# Patient Record
Sex: FEMALE | Race: WHITE | URBAN - METROPOLITAN AREA
[De-identification: names, ages, dates, MRNs, and addresses within clinical notes are randomized per-mention and may not be internally consistent; named-entity substitution may affect disease eponyms.]

---

## 2017-08-17 ENCOUNTER — OFFICE VISIT (OUTPATIENT)
Dept: NEUROLOGY | Age: 27
End: 2017-08-17

## 2017-08-17 VITALS
HEART RATE: 121 BPM | DIASTOLIC BLOOD PRESSURE: 78 MMHG | SYSTOLIC BLOOD PRESSURE: 126 MMHG | WEIGHT: 161 LBS | BODY MASS INDEX: 26.79 KG/M2 | OXYGEN SATURATION: 98 %

## 2017-08-17 DIAGNOSIS — G43.011 INTRACTABLE MIGRAINE WITHOUT AURA AND WITH STATUS MIGRAINOSUS: Primary | ICD-10-CM

## 2017-08-17 RX ORDER — ESCITALOPRAM OXALATE 5 MG/1
TABLET ORAL DAILY
COMMUNITY

## 2017-08-17 RX ORDER — TOPIRAMATE 25 MG/1
25 TABLET ORAL
Qty: 30 TAB | Refills: 5 | Status: SHIPPED | OUTPATIENT
Start: 2017-08-17 | End: 2017-10-13 | Stop reason: ALTCHOICE

## 2017-08-17 NOTE — MR AVS SNAPSHOT
Visit Information Date & Time Provider Department Dept. Phone Encounter #  
 8/17/2017 10:20 AM Clem Guallpa MD [facility] [phone] [id number] Follow-up Instructions Return in about 1 month (around 9/17/2017). Upcoming Health Maintenance Date Due  
 HPV AGE 9Y-34Y (1 of 3 - Female 3 Dose Series) 3/4/2002 Pneumococcal 19-64 Medium Risk (1 of 1 - PPSV23) 3/4/2010 DTaP/Tdap/Td series (1 - Tdap) 3/4/2012 PAP AKA CERVICAL CYTOLOGY 3/4/2012 INFLUENZA AGE 9 TO ADULT 8/1/2017 Allergies as of 8/17/2017  Review Complete On: 8/17/2017 By: Clem Guallpa MD  
  
 Severity Noted Reaction Type Reactions Bactrim [Sulfamethoprim Ds]  03/14/2013    Hives Ceclor [Cefaclor]  03/14/2013    Hives Pcn [Penicillins]  03/14/2013    Hives Current Immunizations  Never Reviewed No immunizations on file. Not reviewed this visit You Were Diagnosed With   
  
 Codes Comments Intractable migraine without aura and with status migrainosus    -  Primary ICD-10-CM: I05.984 
ICD-9-CM: 346.13 Vitals BP Pulse Weight(growth percentile) SpO2 BMI Smoking Status 126/78 (!) 121 161 lb (73 kg) 98% 26.79 kg/m2 Former Smoker BMI and BSA Data Body Mass Index Body Surface Area  
 26.79 kg/m 2 1.83 m 2 Preferred Pharmacy Pharmacy Name Phone [id number] [address] [phone] Your Updated Medication List  
  
   
This list is accurate as of: 8/17/17 11:10 AM.  Always use your most recent med list.  
  
  
  
  
 ADDERALL XR 20 mg XR capsule Generic drug:  amphetamine-dextroamphetamine XR Take 20 mg by mouth every morning. LEXAPRO 5 mg tablet Generic drug:  escitalopram oxalate Take  by mouth daily. OTHER Birthcontrol  
  
 topiramate 25 mg tablet Commonly known as:  TOPAMAX Take 1 Tab by mouth nightly. Prescriptions Sent to Pharmacy Refills  
 topiramate (TOPAMAX) 25 mg tablet 5 Sig: Take 1 Tab by mouth nightly. Class: Normal  
 Pharmacy: [id number] - [facility] [address]  #: [phone] Route: Oral  
  
Follow-up Instructions Return in about 1 month (around 9/17/2017). Patient Instructions PRESCRIPTION REFILL POLICY [facility] Statement to Patients April 1, 2014 In an effort to ensure the large volume of patient prescription refills is processed in the most efficient and expeditious manner, we are asking our patients to assist us by calling your Pharmacy for all prescription refills, this will include also your  Mail Order Pharmacy. The pharmacy will contact our office electronically to continue the refill process. Please do not wait until the last minute to call your pharmacy. We need at least 48 hours (2days) to fill prescriptions. We also encourage you to call your pharmacy before going to  your prescription to make sure it is ready. With regard to controlled substance prescription refill requests (narcotic refills) that need to be picked up at our office, we ask your cooperation by providing us with at least 72 hours (3days) notice that you will need a refill. We will not refill narcotic prescription refill requests after 4:00pm on any weekday, Monday through Thursday, or after 2:00pm on Fridays, or on the weekends. We encourage everyone to explore another way of getting your prescription refill request processed using [employer], our patient web portal through our electronic medical record system. [employer] is an efficient and effective way to communicate your medication request directly to the office and  downloadable as an lucia on your smart phone .  [employer] also features a review functionality that allows you to view your medication list as well as leave messages for your physician. Are you ready to get connected? If so please review the attatched instructions or speak to any of our staff to get you set up right away! Thank you so much for your cooperation. Should you have any questions please contact our Practice Administrator. The Physicians and Staff,  [facility] Introducing [facility] SERVICES! Rosette Natyjosé manuel introduces [employer] patient portal. Now you can access parts of your medical record, email your doctor's office, and request medication refills online. 1. In your internet browser, go to [email or web address]. [email or web address] 2. Click on the First Time User? Click Here link in the Sign In box. You will see the New Member Sign Up page. 3. Enter your [employer] Access Code exactly as it appears below. You will not need to use this code after youve completed the sign-up process. If you do not sign up before the expiration date, you must request a new code. · [employer] Access Code: [email or web address] Expires: 11/15/2017  9:47 AM 
 
4. Enter the last four digits of your Social Security Number (xxxx) and Date of Birth (mm/dd/yyyy) as indicated and click Submit. You will be taken to the next sign-up page. 5. Create a [employer] ID. This will be your [employer] login ID and cannot be changed, so think of one that is secure and easy to remember. 6. Create a [employer] password. You can change your password at any time. 7. Enter your Password Reset Question and Answer. This can be used at a later time if you forget your password. 8. Enter your e-mail address. You will receive e-mail notification when new information is available in [address] 9. Click Sign Up. You can now view and download portions of your medical record. 10. Click the Download Summary menu link to download a portable copy of your medical information.  
 
If you have questions, please visit the Frequently Asked Questions section of the [employer]. Remember, [employer] is NOT to be used for urgent needs. For medical emergencies, dial 911. Now available from your iPhone and Android! Please provide this summary of care documentation to your next provider. Your primary care clinician is listed as Yoana Jimenez. If you have any questions after today's visit, please call [phone].

## 2017-08-17 NOTE — PROGRESS NOTES
NEUROLOGY NEW PATIENT OFFICE CONSULTATION      8/17/2017    RE: Leigh Nava         1990      REFERRED BY:  Norma Suarez MD        CHIEF COMPLAINT:  This is Leigh Nava is a 33 y.o. female right handed  who had concerns including Migraine. HPI:       Since high school, patient has been complaining of headaches, R frontotemporal area, 5/10, for the past 5 yrs have been daily, constant, throbbing, aggravated by weather, relieved with sleeping, (+) nausea (-) vomiting (+) photophobia (+) phonophobia (-) visual auras. Admits to eating chocolate and peperroni. Tried Imitrex (caused nausea), Exedrin (upset stomach), Indomethacin (mild benefit with fatigue)    Review of Systems   Constitutional: Negative for chills and fever. HENT: Negative for hearing loss. Skin: Negative for rash. All other systems reviewed and are negative    Past Medical Hx  Past Medical History:   Diagnosis Date    Asthma     Cervical rib     Headache    ADD  Anxiety - treated by PCP    Social Hx  Social History     Social History    Marital status: SINGLE     Spouse name: N/A    Number of children: N/A    Years of education: N/A     Social History Main Topics    Smoking status: Former Smoker     Packs/day: 1.00     Years: 5.00    Smokeless tobacco: Never Used    Alcohol use No      Comment: occ    Drug use: No    Sexual activity: Not Asked     Other Topics Concern    None     Social History Narrative       Family Hx  Family History   Problem Relation Age of Onset    High Cholesterol Father     Headache Maternal Grandfather    Mother and Father - migraines    ALLERGIES  Allergies   Allergen Reactions    Bactrim [Sulfamethoprim Ds] Hives    Ceclor [Cefaclor] Hives    Pcn [Penicillins] Hives       CURRENT MEDS  Current Outpatient Prescriptions   Medication Sig Dispense Refill    escitalopram oxalate (LEXAPRO) 5 mg tablet Take  by mouth daily.       topiramate (TOPAMAX) 25 mg tablet Take 1 Tab by mouth nightly. 30 Tab 5    amphetamine-dextroamphetamine XR (ADDERALL XR) 20 mg XR capsule Take 20 mg by mouth every morning.  OTHER Birthcontrol             PREVIOUS WORKUP: (reviewed)  IMAGING:    CT Results (recent):  No results found for this or any previous visit. MRI Results (recent):  No results found for this or any previous visit. IR Results (recent):  No results found for this or any previous visit. VAS/US Results (recent):  No results found for this or any previous visit. LABS (reviewed)  No results found for this or any previous visit. Physical Exam:     Visit Vitals    /78    Pulse (!) 121    Wt 73 kg (161 lb)    SpO2 98%    BMI 26.79 kg/m2     General:  Alert, cooperative, no distress. Head:  Normocephalic, without obvious abnormality, atraumatic. Eyes:  Conjunctivae/corneas clear. Lungs:  Heart:   Non labored breathing  Regular rate and rhythm, no carotid bruits   Abdomen:   Soft, non-distended   Extremities: Extremities normal, atraumatic, no cyanosis or edema. Pulses: 2+ and symmetric all extremities. Skin: Skin color, texture, turgor normal. No rashes or lesions.   Neurologic Exam     Gen: Attention normal             Language: naming, repetition, fluency normal             Memory: intact recent and remote memory  Cranial Nerves:  I: smell Not tested   II: visual fields Full to confrontation   II: pupils Equal, round, reactive to light   II: optic disc No papilledema   III,VII: ptosis none   III,IV,VI: extraocular muscles  Full ROM   V: mastication normal   V: facial light touch sensation  normal   VII: facial muscle function   symmetric   VIII: hearing symmetric   IX: soft palate elevation  normal   XI: trapezius strength  5/5   XI: sternocleidomastoid strength 5/5   XI: neck flexion strength  5/5   XII: tongue  midline     Motor: normal bulk and tone, no tremor              Strength: 5/5 all four extremities  Sensory: intact to LT, PP, vibration, and JPS  Reflexes: 2+ throughout; Down going toes  Coordination: Good FTN and HTS, Romberg negative  Gait: normal gait including tandem            Impression:     Faith Poole is a 33 y.o. female with Asthma; Anxiety and ADD. who sdince high school, patient has been complaining of headaches, R frontotemporal area, 5/10, for the past 5 yrs have been daily and severe, constant, throbbing, aggravated by weather, relieved with sleeping, associated with nausea, photophobia and phonophobia consistent with migraine, without auras, intractable. RECOMMENDATIONS  1. Trial of Topamax 25 mg QHS as migrainer prophylaxis  2. Given samples of Cambia and Treximet to try to abort headaches  3. Discussed the need for headache diary and went through the list that can trigger headache (weather, pepperoni, chocolate)    Follow-up Disposition:  Return in about 1 month (around 9/17/2017).         Thank you for the consultation      Earl Barrera MD  Diplomate, American Board of Psychiatry and Neurology  Diplomate, Neuromuscular Medicine  Diplomate, American Board of Electrodiagnostic Medicine    Greater than 50% of time spent counseling patient      CC: Fredy Chavez MD  Fax: [phone]

## 2017-08-17 NOTE — PATIENT INSTRUCTIONS
[facility]   Statement to Patients  April 1, 2014      In an effort to ensure the large volume of patient prescription refills is processed in the most efficient and expeditious manner, we are asking our patients to assist us by calling your Pharmacy for all prescription refills, this will include also your  Mail Order Pharmacy. The pharmacy will contact our office electronically to continue the refill process. Please do not wait until the last minute to call your pharmacy. We need at least 48 hours (2days) to fill prescriptions. We also encourage you to call your pharmacy before going to  your prescription to make sure it is ready. With regard to controlled substance prescription refill requests (narcotic refills) that need to be picked up at our office, we ask your cooperation by providing us with at least 72 hours (3days) notice that you will need a refill. We will not refill narcotic prescription refill requests after 4:00pm on any weekday, Monday through Thursday, or after 2:00pm on Fridays, or on the weekends. We encourage everyone to explore another way of getting your prescription refill request processed using [employer], our patient web portal through our electronic medical record system. [employer] is an efficient and effective way to communicate your medication request directly to the office and  downloadable as an lucia on your smart phone . [employer] also features a review functionality that allows you to view your medication list as well as leave messages for your physician. Are you ready to get connected? If so please review the attatched instructions or speak to any of our staff to get you set up right away! Thank you so much for your cooperation. Should you have any questions please contact our Practice Administrator.     The Physicians and Staff,  [facility]

## 2017-08-17 NOTE — LETTER
8/17/2017 11:13 AM 
 
Patient:  Patrick Babb YOB: 1990 Date of Visit: 8/17/2017 Dear Ana Hsu MD 
[address] [place] [id number] VIA Facsimile: [phone] 
 : Thank you for referring Ms. Patrick Babb to me for evaluation/treatment. Below are the relevant portions of my assessment and plan of care. If you have questions, please do not hesitate to call me. I look forward to following Ms. Sonya Soto along with you. Sincerely, Juhi Miguel MD

## 2017-09-15 ENCOUNTER — OFFICE VISIT (OUTPATIENT)
Dept: NEUROLOGY | Age: 27
End: 2017-09-15

## 2017-09-15 VITALS
DIASTOLIC BLOOD PRESSURE: 82 MMHG | HEART RATE: 118 BPM | BODY MASS INDEX: 26.29 KG/M2 | WEIGHT: 158 LBS | OXYGEN SATURATION: 98 % | SYSTOLIC BLOOD PRESSURE: 122 MMHG

## 2017-09-15 DIAGNOSIS — G43.011 INTRACTABLE MIGRAINE WITHOUT AURA AND WITH STATUS MIGRAINOSUS: Primary | ICD-10-CM

## 2017-09-15 RX ORDER — PROPRANOLOL HYDROCHLORIDE 80 MG/1
80 CAPSULE, EXTENDED RELEASE ORAL
Qty: 30 CAP | Refills: 5 | Status: SHIPPED | OUTPATIENT
Start: 2017-09-15 | End: 2017-10-13 | Stop reason: SDUPTHER

## 2017-09-15 RX ORDER — SUMATRIPTAN AND NAPROXEN SODIUM 85; 500 MG/1; MG/1
TABLET, FILM COATED ORAL
Qty: 9 TAB | Refills: 3 | Status: SHIPPED | OUTPATIENT
Start: 2017-09-15

## 2017-09-15 NOTE — PROGRESS NOTES
Neurology Progress Note    Patient ID:  Shayla Jay  [id number]  89 y.o.  1990      Subjective:   History:  Shayla Jay is a 33 y.o. female with Asthma; Anxiety and ADD. who since high school, has been complaining of headaches, R frontotemporal area, 5/10, for the past 5 yrs have been daily and severe, constant, throbbing, aggravated by weather, relieved with sleeping, associated with nausea, photophobia and phonophobia consistent with migraine, without auras, intractable.     Patient tried Topamax 25 mg QHS but made her slow in thinking and fatigue with no improvement of headaches. Treximet works with no side effects. Cambia not as much  Taking Advil 3-6 tabs every day for the past 5 yrs. Not sleeping well because of neighbor who walks loudly.        ROS:  Per HPI-  Otherwise the remainder of ROS was negative    Social Hx  Social History     Social History    Marital status: SINGLE     Spouse name: N/A    Number of children: N/A    Years of education: N/A     Social History Main Topics    Smoking status: Former Smoker     Packs/day: 1.00     Years: 5.00    Smokeless tobacco: Never Used    Alcohol use No      Comment: occ    Drug use: No    Sexual activity: Not Asked     Other Topics Concern    None     Social History Narrative       Meds:  Current Outpatient Prescriptions on File Prior to Visit   Medication Sig Dispense Refill    escitalopram oxalate (LEXAPRO) 5 mg tablet Take  by mouth daily.  topiramate (TOPAMAX) 25 mg tablet Take 1 Tab by mouth nightly. 30 Tab 5    amphetamine-dextroamphetamine XR (ADDERALL XR) 20 mg XR capsule Take 20 mg by mouth every morning.  OTHER Birthcontrol       No current facility-administered medications on file prior to visit. Imaging:    CT Results (recent):  No results found for this or any previous visit. MRI Results (recent):  No results found for this or any previous visit.     IR Results (recent):  No results found for this or any previous visit.    VAS/US Results (recent):  No results found for this or any previous visit. Reviewed records in [facility] and media tab today    Lab Review     No results found for any previous visit. Objective:       Exam:  Visit Vitals    /82    Pulse (!) 118    Wt 71.7 kg (158 lb)    SpO2 98%    BMI 26.29 kg/m2     Gen: Awake, alert, follows commands  Appropriate appearance, normal speech. Oriented to all spheres. No visual field defect on confrontation exam.  Full eyes movement, with no nystagmus, no diplopia, no ptosis. Normal gag and swallow. All remaining cranial nerves were normal  Motor function: 5/5 in all extremities  Sensory: intact to LT, PP and JPS  Good FTN and HTS   Gait: Normal    Assessment:       ICD-10-CM ICD-9-CM    1. Intractable migraine without aura and with status migrainosus G43.011 346.13 propranolol LA (INDERAL LA) 80 mg SR capsule      SUMAtriptan-naproxen (TREXIMET)  mg tablet           Plan:   1. Switch to Inderal LA 80 mg QHS as migraine prophylaxis. Monitor BP  2. Continue Treximet prn to try to abort headaches  3. Continue headache diary and list that can trigger headache (weather, pepperoni, chocolate)    Follow-up Disposition:  Return in about 1 month (around 10/15/2017).           Svitlana Ambrose MD  Diplomate, American Board of Psychiatry and Neurology  Diplomate, Neuromuscular Medicine  Diplomate, American Board of Electrodiagnostic Medicine

## 2017-09-15 NOTE — MR AVS SNAPSHOT
Visit Information Date & Time Provider Department Dept. Phone Encounter #  
 9/15/2017  9:40 AM Saurav Reynolds MD [facility] Neurology [facility] [phone] [id number] Follow-up Instructions Return in about 1 month (around 10/15/2017). Upcoming Health Maintenance Date Due  
 HPV AGE 9Y-34Y (1 of 3 - Female 3 Dose Series) 3/4/2002 DTaP/Tdap/Td series (1 - Tdap) 3/4/2012 PAP AKA CERVICAL CYTOLOGY 3/4/2012 INFLUENZA AGE 9 TO ADULT 8/1/2017 Allergies as of 9/15/2017  Review Complete On: 9/15/2017 By: Jose Peralta LPN Severity Noted Reaction Type Reactions Bactrim [Sulfamethoprim Ds]  03/14/2013    Hives Ceclor [Cefaclor]  03/14/2013    Hives Pcn [Penicillins]  03/14/2013    Hives Current Immunizations  Never Reviewed No immunizations on file. Not reviewed this visit You Were Diagnosed With   
  
 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 
ICD-9-CM: 346.13 Vitals BP Pulse Weight(growth percentile) SpO2 BMI Smoking Status 122/82 (!) 118 158 lb (71.7 kg) 98% 26.29 kg/m2 Former Smoker Vitals History BMI and BSA Data Body Mass Index Body Surface Area  
 26.29 kg/m 2 1.81 m 2 Preferred Pharmacy Pharmacy Name Phone [address], [address] [address] [facility] [phone] Your Updated Medication List  
  
   
This list is accurate as of: 9/15/17 10:14 AM.  Always use your most recent med list.  
  
  
  
  
 ADDERALL XR 20 mg XR capsule Generic drug:  amphetamine-dextroamphetamine XR Take 20 mg by mouth every morning. LEXAPRO 5 mg tablet Generic drug:  escitalopram oxalate Take  by mouth daily. OTHER Birthcontrol  
  
 propranolol LA 80 mg SR capsule Commonly known as:  INDERAL LA Take 1 Cap by mouth nightly. SUMAtriptan-naproxen  mg tablet Commonly known as:  TREXIMET Take one tablet at headache onset and repeat in 2 hours x1 prn  
  
 topiramate 25 mg tablet Commonly known as:  TOPAMAX Take 1 Tab by mouth nightly. Prescriptions Sent to Pharmacy Refills  
 propranolol LA (INDERAL LA) 80 mg SR capsule 5 Sig: Take 1 Cap by mouth nightly. Class: Normal  
 Pharmacy: [facility]/pharmacy #[id number] - [place], [place] - [address] [address] Ph #: [phone] Route: Oral  
 SUMAtriptan-naproxen (TREXIMET)  mg tablet 3 Sig: Take one tablet at headache onset and repeat in 2 hours x1 prn Class: Normal  
 Pharmacy: [facility] [id number] [facility] Ph #: [phone] Follow-up Instructions Return in about 1 month (around 10/15/2017). Patient Instructions PRESCRIPTION REFILL POLICY [facility] Statement to Patients April 1, 2014 In an effort to ensure the large volume of patient prescription refills is processed in the most efficient and expeditious manner, we are asking our patients to assist us by calling your Pharmacy for all prescription refills, this will include also your  Mail Order Pharmacy. The pharmacy will contact our office electronically to continue the refill process. Please do not wait until the last minute to call your pharmacy. We need at least 48 hours (2days) to fill prescriptions. We also encourage you to call your pharmacy before going to  your prescription to make sure it is ready. With regard to controlled substance prescription refill requests (narcotic refills) that need to be picked up at our office, we ask your cooperation by providing us with at least 72 hours (3days) notice that you will need a refill.  
 
We will not refill narcotic prescription refill requests after 4:00pm on any weekday, Monday through Thursday, or after 2:00pm on Fridays, or on the weekends. We encourage everyone to explore another way of getting your prescription refill request processed using Plan[employer], our patient web portal through our electronic medical record system. E-[employer] is an efficient and effective way to communicate your medication request directly to the office and  downloadable as an lucia on your smart phone . Planana also features a review functionality that allows you to view your medication list as well as leave messages for your physician. Are you ready to get connected? If so please review the attatched instructions or speak to any of our staff to get you set up right away! Thank you so much for your cooperation. Should you have any questions please contact our Practice Administrator. The Physicians and Staff,  [facility] PRESCRIPTION REFILL POLICY [facility] Neurology [facility] Statement to Patients April 1, 2014 In an effort to ensure the large volume of patient prescription refills is processed in the most efficient and expeditious manner, we are asking our patients to assist us by calling your Pharmacy for all prescription refills, this will include also your  Mail Order Pharmacy. The pharmacy will contact our office electronically to continue the refill process. Please do not wait until the last minute to call your pharmacy. We need at least 48 hours (2days) to fill prescriptions. We also encourage you to call your pharmacy before going to  your prescription to make sure it is ready. With regard to controlled substance prescription refill requests (narcotic refills) that need to be picked up at our office, we ask your cooperation by providing us with at least 72 hours (3days) notice that you will need a refill.  
 
We will not refill narcotic prescription refill requests after 4:00pm on any weekday, Monday through Thursday, or after 2:00pm on Fridays, or on the weekends. We encourage everyone to explore another way of getting your prescription refill request processed using myTomorrows, our patient web portal through our electronic medical record system. Mimeot is an efficient and effective way to communicate your medication request directly to the office and  downloadable as an lucia on your smart phone . myTomorrows also features a review functionality that allows you to view your medication list as well as leave messages for your physician. Are you ready to get connected? If so please review the attatched instructions or speak to any of our staff to get you set up right away! Thank you so much for your cooperation. Should you have any questions please contact our Practice Administrator. The Physicians and Staff,  [facility] Introducing [facility]! [facility] introduces myTomorrows patient portal. Now you can access parts of your medical record, email your doctor's office, and request medication refills online. 1. In your internet browser, go to [email or web address] 2. Click on the First Time User? Click Here link in the Sign In box. You will see the New Member Sign Up page. 3. Enter your myTomorrows Access Code exactly as it appears below. You will not need to use this code after youve completed the sign-up process. If you do not sign up before the expiration date, you must request a new code. · myTomorrows Access Code: [email or web address] Expires: 11/15/2017  9:47 AM 
 
4. Enter the last four digits of your Social Security Number (xxxx) and Date of Birth (mm/dd/yyyy) as indicated and click Submit. You will be taken to the next sign-up page. 5. Create a myTomorrows ID. This will be your myTomorrows login ID and cannot be changed, so think of one that is secure and easy to remember. 6. Create a [employer] password. You can change your password at any time. 7. Enter your Password Reset Question and Answer. This can be used at a later time if you forget your password. 8. Enter your e-mail address. You will receive e-mail notification when new information is available in [address] 9. Click Sign Up. You can now view and download portions of your medical record. 10. Click the Download Summary menu link to download a portable copy of your medical information. If you have questions, please visit the Frequently Asked Questions section of the [employer] website. Remember, [employer] is NOT to be used for urgent needs. For medical emergencies, dial 911. Now available from your iPhone and Android! Please provide this summary of care documentation to your next provider. Your primary care clinician is listed as Xi Snyder. If you have any questions after today's visit, please call [phone].

## 2017-09-15 NOTE — LETTER
9/15/2017 10:16 AM 
 
Patient:  Susan Jamil YOB: 1990 Date of Visit: 9/15/2017 Dear Francia Fajardo MD 
[address] [place] [id number] VIA Facsimile: [phone] 
 : Thank you for referring Ms. Susan Jamil to me for evaluation/treatment. Below are the relevant portions of my assessment and plan of care. If you have questions, please do not hesitate to call me. I look forward to following Ms. Ngoc Lynch along with you. Sincerely, Diana Coreas MD

## 2017-09-15 NOTE — PATIENT INSTRUCTIONS
10 [facility]   Statement to Patients  April 1, 2014      In an effort to ensure the large volume of patient prescription refills is processed in the most efficient and expeditious manner, we are asking our patients to assist us by calling your Pharmacy for all prescription refills, this will include also your  Mail Order Pharmacy. The pharmacy will contact our office electronically to continue the refill process. Please do not wait until the last minute to call your pharmacy. We need at least 48 hours (2days) to fill prescriptions. We also encourage you to call your pharmacy before going to  your prescription to make sure it is ready. With regard to controlled substance prescription refill requests (narcotic refills) that need to be picked up at our office, we ask your cooperation by providing us with at least 72 hours (3days) notice that you will need a refill. We will not refill narcotic prescription refill requests after 4:00pm on any weekday, Monday through Thursday, or after 2:00pm on Fridays, or on the weekends. We encourage everyone to explore another way of getting your prescription refill request processed using [employer], our patient web portal through our electronic medical record system. [employer] is an efficient and effective way to communicate your medication request directly to the office and  downloadable as an lucia on your smart phone . [employer] also features a review functionality that allows you to view your medication list as well as leave messages for your physician. Are you ready to get connected? If so please review the attatched instructions or speak to any of our staff to get you set up right away! Thank you so much for your cooperation. Should you have any questions please contact our Practice Administrator.     The Physicians and Staff,  [address] Neurology [facility] Statement to Patients  April 1, 2014      In an effort to ensure the large volume of patient prescription refills is processed in the most efficient and expeditious manner, we are asking our patients to assist us by calling your Pharmacy for all prescription refills, this will include also your  Mail Order Pharmacy. The pharmacy will contact our office electronically to continue the refill process. Please do not wait until the last minute to call your pharmacy. We need at least 48 hours (2days) to fill prescriptions. We also encourage you to call your pharmacy before going to  your prescription to make sure it is ready. With regard to controlled substance prescription refill requests (narcotic refills) that need to be picked up at our office, we ask your cooperation by providing us with at least 72 hours (3days) notice that you will need a refill. We will not refill narcotic prescription refill requests after 4:00pm on any weekday, Monday through Thursday, or after 2:00pm on Fridays, or on the weekends. We encourage everyone to explore another way of getting your prescription refill request processed using [employer], our patient web portal through our electronic medical record system. [employer] is an efficient and effective way to communicate your medication request directly to the office and  downloadable as an lucia on your smart phone . [employer] also features a review functionality that allows you to view your medication list as well as leave messages for your physician. Are you ready to get connected? If so please review the attatched instructions or speak to any of our staff to get you set up right away! Thank you so much for your cooperation. Should you have any questions please contact our Practice Administrator.     The Physicians and Staff,  [facility]

## 2017-10-13 ENCOUNTER — OFFICE VISIT (OUTPATIENT)
Dept: NEUROLOGY | Age: 27
End: 2017-10-13

## 2017-10-13 VITALS
OXYGEN SATURATION: 98 % | WEIGHT: 159 LBS | DIASTOLIC BLOOD PRESSURE: 72 MMHG | SYSTOLIC BLOOD PRESSURE: 108 MMHG | HEART RATE: 62 BPM | BODY MASS INDEX: 26.46 KG/M2

## 2017-10-13 DIAGNOSIS — G43.011 INTRACTABLE MIGRAINE WITHOUT AURA AND WITH STATUS MIGRAINOSUS: ICD-10-CM

## 2017-10-13 RX ORDER — PROPRANOLOL HYDROCHLORIDE 120 MG/1
120 CAPSULE, EXTENDED RELEASE ORAL
Qty: 30 CAP | Refills: 5 | Status: SHIPPED | OUTPATIENT
Start: 2017-10-13 | End: 2017-11-06 | Stop reason: SDUPTHER

## 2017-10-13 NOTE — LETTER
10/13/2017 11:22 AM 
 
Patient:  Joaquin Moritz YOB: 1990 Date of Visit: 10/13/2017 Dear Shawn Valera MD 
[address] [place] [address] VIA Facsimile: [phone] 
 : Thank you for referring Ms. Joaquin Moritz to me for evaluation/treatment. Below are the relevant portions of my assessment and plan of care. If you have questions, please do not hesitate to call me. I look forward to following Ms. Maru Mike along with you. Sincerely, Radha Nj MD

## 2017-10-13 NOTE — PATIENT INSTRUCTIONS
10 [facility] Neurology Clinic   Statement to Patients  April 1, 2014      In an effort to ensure the large volume of patient prescription refills is processed in the most efficient and expeditious manner, we are asking our patients to assist us by calling your Pharmacy for all prescription refills, this will include also your  Mail Order Pharmacy. The pharmacy will contact our office electronically to continue the refill process. Please do not wait until the last minute to call your pharmacy. We need at least 48 hours (2days) to fill prescriptions. We also encourage you to call your pharmacy before going to  your prescription to make sure it is ready. With regard to controlled substance prescription refill requests (narcotic refills) that need to be picked up at our office, we ask your cooperation by providing us with at least 72 hours (3days) notice that you will need a refill. We will not refill narcotic prescription refill requests after 4:00pm on any weekday, Monday through Thursday, or after 2:00pm on Fridays, or on the weekends. We encourage everyone to explore another way of getting your prescription refill request processed using Unowhy, our patient web portal through our electronic medical record system. Unowhy is an efficient and effective way to communicate your medication request directly to the office and  downloadable as an lucia on your smart phone . [employer] also features a review functionality that allows you to view your medication list as well as leave messages for your physician. Are you ready to get connected? If so please review the attatched instructions or speak to any of our staff to get you set up right away! Thank you so much for your cooperation. Should you have any questions please contact our Practice Administrator.     The Physicians and Staff,  [address] [place] [address] Neurology Clinic Statement to Patients  April 1, 2014      In an effort to ensure the large volume of patient prescription refills is processed in the most efficient and expeditious manner, we are asking our patients to assist us by calling your Pharmacy for all prescription refills, this will include also your  Mail Order Pharmacy. The pharmacy will contact our office electronically to continue the refill process. Please do not wait until the last minute to call your pharmacy. We need at least 48 hours (2days) to fill prescriptions. We also encourage you to call your pharmacy before going to  your prescription to make sure it is ready. With regard to controlled substance prescription refill requests (narcotic refills) that need to be picked up at our office, we ask your cooperation by providing us with at least 72 hours (3days) notice that you will need a refill. We will not refill narcotic prescription refill requests after 4:00pm on any weekday, Monday through Thursday, or after 2:00pm on Fridays, or on the weekends. We encourage everyone to explore another way of getting your prescription refill request processed using Pinshape, our patient web portal through our electronic medical record system. Pinshape is an efficient and effective way to communicate your medication request directly to the office and  downloadable as an lucia on your smart phone . P[employer] also features a review functionality that allows you to view your medication list as well as leave messages for your physician. Are you ready to get connected? If so please review the attatched instructions or speak to any of our staff to get you set up right away! Thank you so much for your cooperation. Should you have any questions please contact our Practice Administrator.     The Physicians and Staff,  [facility] Neurology [facility]

## 2017-10-13 NOTE — MR AVS SNAPSHOT
Visit Information Date & Time Provider Department Dept. Phone Encounter #  
 10/13/2017 10:40 AM Radha Briceno MD [facility] [phone] [id number] Follow-up Instructions Return in about 3 months (around 1/13/2018). Upcoming Health Maintenance Date Due  
 HPV AGE 9Y-34Y (1 of 3 - Female 3 Dose Series) 3/4/2002 DTaP/Tdap/Td series (1 - Tdap) 3/4/2012 PAP AKA CERVICAL CYTOLOGY 3/4/2012 INFLUENZA AGE 9 TO ADULT 8/1/2017 Allergies as of 10/13/2017  Review Complete On: 10/13/2017 By: Asha Clark LPN Severity Noted Reaction Type Reactions Bactrim [Sulfamethoprim Ds]  03/14/2013    Hives Ceclor [Cefaclor]  03/14/2013    Hives Pcn [Penicillins]  03/14/2013    Hives Current Immunizations  Never Reviewed No immunizations on file. Not reviewed this visit You Were Diagnosed With   
  
 Codes Comments Intractable migraine without aura and with status migrainosus     ICD-10-CM: G43.011 
ICD-9-CM: 346.13 Vitals BP Pulse Weight(growth percentile) SpO2 BMI Smoking Status 108/72 62 159 lb (72.1 kg) 98% 26.46 kg/m2 Former Smoker Vitals History BMI and BSA Data Body Mass Index Body Surface Area  
 26.46 kg/m 2 1.82 m 2 Preferred Pharmacy Pharmacy Name Phone [facility]/PHARMACY #[id number] [facility] [address] [facility] Balwinder Espinoza [phone] Your Updated Medication List  
  
   
This list is accurate as of: 10/13/17 11:20 AM.  Always use your most recent med list.  
  
  
  
  
 ADDERALL XR 20 mg XR capsule Generic drug:  amphetamine-dextroamphetamine XR Take 20 mg by mouth every morning. LEXAPRO 5 mg tablet Generic drug:  escitalopram oxalate Take  by mouth daily. OTHER Birthcontrol  
  
 propranolol  mg SR capsule Commonly known as:  INDERAL LA Take 1 Cap by mouth nightly. SUMAtriptan-naproxen  mg tablet Commonly known as:  TREXIMET Take one tablet at headache onset and repeat in 2 hours x1 prn  
  
  
  
  
Prescriptions Sent to Pharmacy Refills  
 propranolol LA (INDERAL LA) 120 mg SR capsule 5 Sig: Take 1 Cap by mouth nightly. Class: Normal  
 Pharmacy: [facility]/pharmacy #[id number] - [place] - [address] [address]  #: [phone] Route: Oral  
  
Follow-up Instructions Return in about 3 months (around 1/13/2018). Patient Instructions PRESCRIPTION REFILL POLICY [facility] Statement to Patients April 1, 2014 In an effort to ensure the large volume of patient prescription refills is processed in the most efficient and expeditious manner, we are asking our patients to assist us by calling your Pharmacy for all prescription refills, this will include also your  Mail Order Pharmacy. The pharmacy will contact our office electronically to continue the refill process. Please do not wait until the last minute to call your pharmacy. We need at least 48 hours (2days) to fill prescriptions. We also encourage you to call your pharmacy before going to  your prescription to make sure it is ready. With regard to controlled substance prescription refill requests (narcotic refills) that need to be picked up at our office, we ask your cooperation by providing us with at least 72 hours (3days) notice that you will need a refill. We will not refill narcotic prescription refill requests after 4:00pm on any weekday, Monday through Thursday, or after 2:00pm on Fridays, or on the weekends. We encourage everyone to explore another way of getting your prescription refill request processed using [employer], our patient web portal through our electronic medical record system.  [employer] is an efficient and effective way to communicate your medication request directly to the office and downloadable as an lucia on your smart phone . [employer] also features a review functionality that allows you to view your medication list as well as leave messages for your physician. Are you ready to get connected? If so please review the attatched instructions or speak to any of our staff to get you set up right away! Thank you so much for your cooperation. Should you have any questions please contact our Practice Administrator. The Physicians and Staff,  [facility] PRESCRIPTION REFILL POLICY [facility] Statement to Patients April 1, 2014 In an effort to ensure the large volume of patient prescription refills is processed in the most efficient and expeditious manner, we are asking our patients to assist us by calling your Pharmacy for all prescription refills, this will include also your  Mail Order Pharmacy. The pharmacy will contact our office electronically to continue the refill process. Please do not wait until the last minute to call your pharmacy. We need at least 48 hours (2days) to fill prescriptions. We also encourage you to call your pharmacy before going to  your prescription to make sure it is ready. With regard to controlled substance prescription refill requests (narcotic refills) that need to be picked up at our office, we ask your cooperation by providing us with at least 72 hours (3days) notice that you will need a refill. We will not refill narcotic prescription refill requests after 4:00pm on any weekday, Monday through Thursday, or after 2:00pm on Fridays, or on the weekends. We encourage everyone to explore another way of getting your prescription refill request processed using [employer], our patient web portal through our electronic medical record system.  [employer] is an efficient and effective way to communicate your medication request directly to the office and downloadable as an lucia on your smart phone . [employer] also features a review functionality that allows you to view your medication list as well as leave messages for your physician. Are you ready to get connected? If so please review the attatched instructions or speak to any of our staff to get you set up right away! Thank you so much for your cooperation. Should you have any questions please contact our Practice Administrator. The Physicians and Staff,  [facility] Introducing [facility]! [facility] introduces [employer] patient portal. Now you can access parts of your medical record, email your doctor's office, and request medication refills online. 1. In your internet browser, go to [email or web address] 2. Click on the First Time User? Click Here link in the Sign In box. You will see the New Member Sign Up page. 3. Enter your [employer] Access Code exactly as it appears below. You will not need to use this code after youve completed the sign-up process. If you do not sign up before the expiration date, you must request a new code. · [employer] Access Code: [email or web address] Expires: 11/15/2017  9:47 AM 
 
4. Enter the last four digits of your Social Security Number (xxxx) and Date of Birth (mm/dd/yyyy) as indicated and click Submit. You will be taken to the next sign-up page. 5. Create a [employer] ID. This will be your [employer] login ID and cannot be changed, so think of one that is secure and easy to remember. 6. Create a [employer] password. You can change your password at any time. 7. Enter your Password Reset Question and Answer. This can be used at a later time if you forget your password. 8. Enter your e-mail address. You will receive e-mail notification when new information is available in [address] 9. Click Sign Up. You can now view and download portions of your medical record. 10. Click the Download Summary menu link to download a portable copy of your medical information. If you have questions, please visit the Frequently Asked Questions section of the [facility] website. Remember, [facility] is NOT to be used for urgent needs. For medical emergencies, dial 911. Now available from your iPhone and Android! Please provide this summary of care documentation to your next provider. Your primary care clinician is listed as Stephanie Marie. If you have any questions after today's visit, please call [phone].

## 2017-10-13 NOTE — PROGRESS NOTES
Neurology Progress Note    Patient ID:  Theresa Woods  [id number]  21 y.o.  1990      Subjective:   History:  Tasia Tariq a 33 y.o. female with Asthma; Anxiety and ADD. who since high school, has been complaining of headaches, R frontotemporal area, 5/10, for the past 5 yrs have been daily and severe, constant, throbbing, aggravated by weather, relieved with sleeping, associated with nausea, photophobia and phonophobia consistent with migraine, without auras, intractable.      Patient is off Topamax and started Inderal LA 80 mg with note of good control of anxiety and palpitation but still gets headaches every day 5/10. Treximet still works. ROS:  Per HPI-  Otherwise the remainder of ROS was negative    Social Hx  Social History     Social History    Marital status: SINGLE     Spouse name: N/A    Number of children: N/A    Years of education: N/A     Social History Main Topics    Smoking status: Former Smoker     Packs/day: 1.00     Years: 5.00    Smokeless tobacco: Never Used    Alcohol use No      Comment: occ    Drug use: No    Sexual activity: Not Asked     Other Topics Concern    None     Social History Narrative       Meds:  Current Outpatient Prescriptions on File Prior to Visit   Medication Sig Dispense Refill    SUMAtriptan-naproxen (TREXIMET)  mg tablet Take one tablet at headache onset and repeat in 2 hours x1 prn 9 Tab 3    escitalopram oxalate (LEXAPRO) 5 mg tablet Take  by mouth daily.  amphetamine-dextroamphetamine XR (ADDERALL XR) 20 mg XR capsule Take 20 mg by mouth every morning.  OTHER Birthcontrol       No current facility-administered medications on file prior to visit. Imaging:    CT Results (recent):  No results found for this or any previous visit. MRI Results (recent):  No results found for this or any previous visit. IR Results (recent):  No results found for this or any previous visit.     VAS/US Results (recent):  No results found for this or any previous visit. Reviewed records in [facility] tab today    Lab Review     No results found for any previous visit. Objective:       Exam:  Visit Vitals    /72    Pulse 62    Wt 72.1 kg (159 lb)    SpO2 98%    BMI 26.46 kg/m2     Gen: Awake, alert, follows commands  Appropriate appearance, normal speech. Oriented to all spheres. No visual field defect on confrontation exam.  Full eyes movement, with no nystagmus, no diplopia, no ptosis. Normal gag and swallow. All remaining cranial nerves were normal  Motor function: 5/5 in all extremities  Sensory: intact to LT, PP and JPS  Good FTN and HTS   Gait: Normal    Assessment:       ICD-10-CM ICD-9-CM    1. Intractable migraine without aura and with status migrainosus G43.011 346.13 propranolol LA (INDERAL LA) 120 mg SR capsule           Plan:   1. Increase Inderal  mg QHS as migraine prophylaxis. Monitor BP. Patient to call for update  2. Continue Treximet prn to try to abort headaches  3. Continue headache diary and list that can trigger headache (weather, pepperoni, chocolate)        Follow-up Disposition:  Return in about 3 months (around 1/13/2018).           Kamla Wilson MD  Diplomate, American Board of Psychiatry and Neurology  Diplomate, Neuromuscular Medicine  Diplomate, American Board of Electrodiagnostic Medicine

## 2017-11-09 RX ORDER — PROPRANOLOL HYDROCHLORIDE 160 MG/1
160 CAPSULE, EXTENDED RELEASE ORAL DAILY
Qty: 30 CAP | Refills: 4 | Status: SHIPPED | OUTPATIENT
Start: 2017-11-09

## 2017-11-09 NOTE — TELEPHONE ENCOUNTER
Order placed for Inderal  mg, # 30 tabs, PO, per Verbal Order from Dr. Hannah Leary on 11/9/2017 due to migraine.

## 2017-12-04 ENCOUNTER — PATIENT MESSAGE (OUTPATIENT)
Dept: NEUROLOGY | Age: 27
End: 2017-12-04

## 2017-12-04 RX ORDER — PROPRANOLOL HYDROCHLORIDE 120 MG/1
240 CAPSULE, EXTENDED RELEASE ORAL DAILY
Qty: 60 CAP | Refills: 3 | Status: SHIPPED | OUTPATIENT
Start: 2017-12-04

## 2017-12-04 NOTE — TELEPHONE ENCOUNTER
Order placed for Propranolol  mg 2 tabs daily, # 60, PO, per Verbal Order from Dr. Juan Hayes on 12/4/2017 due to headache.

## 2017-12-04 NOTE — TELEPHONE ENCOUNTER
From: Estefani Pearl  To: Darryl Mccain MD  Sent: 12/4/2017 1:02 PM EST  Subject: Prescription Question    Hi, I just got a notice saying you sent in my refill for the 160mg- however, it isn't working. should we increase? thanks. It's pricey so I'd rather not  another one that doesn't work, so hopefully we can increase.